# Patient Record
Sex: MALE | Race: BLACK OR AFRICAN AMERICAN | URBAN - METROPOLITAN AREA
[De-identification: names, ages, dates, MRNs, and addresses within clinical notes are randomized per-mention and may not be internally consistent; named-entity substitution may affect disease eponyms.]

---

## 2018-02-07 ENCOUNTER — OFFICE VISIT (OUTPATIENT)
Dept: FAMILY MEDICINE | Facility: CLINIC | Age: 43
End: 2018-02-07
Payer: COMMERCIAL

## 2018-02-07 VITALS
DIASTOLIC BLOOD PRESSURE: 73 MMHG | BODY MASS INDEX: 23.57 KG/M2 | HEART RATE: 90 BPM | WEIGHT: 150.2 LBS | SYSTOLIC BLOOD PRESSURE: 122 MMHG | HEIGHT: 67 IN | TEMPERATURE: 98.4 F | OXYGEN SATURATION: 97 %

## 2018-02-07 DIAGNOSIS — G44.029 CHRONIC CLUSTER HEADACHE, NOT INTRACTABLE: Primary | ICD-10-CM

## 2018-02-07 DIAGNOSIS — J32.9 CHRONIC SINUSITIS, UNSPECIFIED LOCATION: ICD-10-CM

## 2018-02-07 RX ORDER — SUMATRIPTAN 25 MG/1
25-50 TABLET, FILM COATED ORAL
Qty: 18 TABLET | Refills: 1 | Status: SHIPPED | OUTPATIENT
Start: 2018-02-07

## 2018-02-07 RX ORDER — FLUTICASONE PROPIONATE 50 MCG
1-2 SPRAY, SUSPENSION (ML) NASAL DAILY
Qty: 3 BOTTLE | Refills: 1 | Status: SHIPPED | OUTPATIENT
Start: 2018-02-07

## 2018-02-07 RX ORDER — PREDNISONE 20 MG/1
40 TABLET ORAL DAILY
COMMUNITY

## 2018-02-07 NOTE — PROGRESS NOTES
"  Family History   Problem Relation Age of Onset     Other Cancer Other      DIABETES No family hx of      Coronary Artery Disease No family hx of      Hypertension No family hx of      Social History     Social History     Marital status: Single     Spouse name: N/A     Number of children: N/A     Years of education: N/A     Social History Main Topics     Smoking status: Current Every Day Smoker     Packs/day: 0.50     Types: Cigarettes     Smokeless tobacco: Never Used      Comment: has cut down and slowly quitting     Alcohol use Yes      Comment: rarely     Drug use: Yes     Special: Marijuana     Sexual activity: Not Asked     Other Topics Concern     None     Social History Narrative     None       There are no exam notes on file for this visit.  Chief Complaint   Patient presents with     Headache     per pt states that he has had these headaches for the past 20 years and was told it was his sinus. Was seen at Urgent care and given prednisone to take, state that it only lessen how often his headaches would occur.      Blood pressure 122/73, pulse 90, temperature 98.4  F (36.9  C), temperature source Oral, height 5' 6.5\" (168.9 cm), weight 150 lb 3.2 oz (68.1 kg), SpO2 97 %.    S:  Has had headache since 21. Occurs eery April and October every year \"like clock-work\". Patient reports that this year it is early. Now has runny eyes, headache. Patient reports that the pain is around left eye. Pain is throbbing, pulsing. Patient was seen at urgency rooms and diagnosed as episodic cluster headache. No radiation to back of head. Admits to tearing up, runny nose. No numbness, tingling. Admits to having palpation or racing heart with headache. Headaches last for days.. Has tried excedrin with minimal relief    Patient admits to having chronic congestion. No changes in congestion during specific season    Patient has used flonase in the past and it has helped. Has stopped due to concern of possible sinus infection    No " "medicial condition  Patient has history of reconstructive surgery of Left face due to fracture nose    O:  /73  Pulse 90  Temp 98.4  F (36.9  C) (Oral)  Ht 5' 6.5\" (168.9 cm)  Wt 150 lb 3.2 oz (68.1 kg)  SpO2 97%  BMI 23.88 kg/m2  General: On Chair, occasional cough, NAD,   HEENT: No conjunctivitis, no scleral injections, EOM intact.  TM is non-bulging, no erythema, no fluid behind ear.   Patient has noneerythematous nasal tubunates, has clear rhinorhea. patent nares with no deviation of septum  Throat is non erythmatous with no postnasal drip noted with none swollen tonsils  Neck has no adenopathy  Heart: RRR, no murmurs, rubs clicks  Respiratory: No respiratory distress, no accessory muscle use, no cough. clear  breath sounds throughout  Skin: No lesions noted    A/P:  1. Chronic cluster headache, not intractable  From history and physical, likely Cluster headache. Will Rx abortive therapy. Discussed taking it at the beginning and risks and benefits. Ddx include sinus infection, migraine although unlikely. Will need to f/u in 1 month to ensure working and to do well exam  - SUMAtriptan (IMITREX) 25 MG tablet; Take 1-2 tablets (25-50 mg) by mouth at onset of headache for migraine May repeat in 2 hours. Max 8 tablets/24 hours.  Dispense: 18 tablet; Refill: 1    2. Chronic sinusitis, unspecified location  Patient has runny nose most predominant in April and October. Patient may have allergic component to HA. Will Rx flonase. Pt has taken in past but d/c due to concern for infection. Unlikely to be chronic infection (no fever, no pain). Will restart at this time  - fluticasone (FLONASE) 50 MCG/ACT spray; Spray 1-2 sprays into both nostrils daily  Dispense: 3 Bottle; Refill: 1    Gopal Green  Family Medicine Resident PGY3  Patient Instructions   - Try the imitrex as soon as you get  Headache  - If fevers, chills, numbness tingling, bad vision changes, or pain not helped at all, call us or go to ED  - If " any swelling, or feel unwell, stop the imitrix and call us

## 2018-02-07 NOTE — PATIENT INSTRUCTIONS
- Try the imitrex as soon as you get  Headache  - If fevers, chills, numbness tingling, bad vision changes, or pain not helped at all, call us or go to ED  - If any swelling, or feel unwell, stop the imitrix and call us

## 2018-02-07 NOTE — MR AVS SNAPSHOT
After Visit Summary   2018    Joseph Pat    MRN: [id number]           Patient Information     Date Of Birth          1975        Visit Information        Provider Department      2018 1:30 PM Gopal Green DO [facility]        Today's Diagnoses     Chronic cluster headache, not intractable    -  1      Care Instructions    - Try the imitrex as soon as you get  Headache  - If fevers, chills, numbness tingling, bad vision changes, or pain not helped at all, call us or go to ED  - If any swelling, or feel unwell, stop the imitrix and call us          Follow-ups after your visit        Who to contact     Please call your clinic at [phone] to:    Ask questions about your health    Make or cancel appointments    Discuss your medicines    Learn about your test results    Speak to your doctor   If you have compliments or concerns about an experience at your clinic, or if you wish to file a complaint, please contact [facility] Patient Relations at [phone] or email us at [email or web address]         Additional Information About Your Visit        MyChart Information     Voxify is an electronic gateway that provides easy, online access to your medical records. With Voxify, you can request a clinic appointment, read your test results, renew a prescription or communicate with your care team.     To sign up for Voxify visit the website at [email or web address]   You will be asked to enter the access code listed below, as well as some personal information. Please follow the directions to create your username and password.     Your access code is: [email or web address]  Expires: 2018  1:51 PM     Your access code will  in 90 days. If you need help or a new code, please contact your [facility] or call [phone] for assistance.        Care EveryWhere ID     This is your Care EveryWhere ID. This could be  "used by other organizations to access your [place] medical records  [id number]        Your Vitals Were     Pulse Temperature Height Pulse Oximetry BMI (Body Mass Index)       90 98.4  F (36.9  C) (Oral) 5' 6.5\" (168.9 cm) 97% 23.88 kg/m2        Blood Pressure from Last 3 Encounters:   02/07/18 122/73    Weight from Last 3 Encounters:   02/07/18 150 lb 3.2 oz (68.1 kg)              Today, you had the following     No orders found for display         Today's Medication Changes          These changes are accurate as of 2/7/18  1:51 PM.  If you have any questions, ask your nurse or doctor.               Start taking these medicines.        Dose/Directions    SUMAtriptan 25 MG tablet   Commonly known as:  IMITREX   Used for:  Chronic cluster headache, not intractable   Started by:  Gopal Green DO        Dose:  25-50 mg   Take 1-2 tablets (25-50 mg) by mouth at onset of headache for migraine May repeat in 2 hours. Max 8 tablets/24 hours.   Quantity:  18 tablet   Refills:  1            Where to get your medicines      These medications were sent to [facility] [address] [place] [address] [address] AT [facility] [address]  [address], [facility] [address]     Phone:  [phone]     SUMAtriptan 25 MG tablet                Primary Care Provider Office Phone # Fax #    Gopal Green [phone]       [address] [address]        Equal Access to Services     ANTHONY ANDRADE AH: Hadii aad ku hadasho Soomaali, waaxda luqadaha, qaybta kaalmada adeegyada, waxaly newman. So [facility] [phone].    ATENCIÓN: Si habla español, tiene a berumen disposición servicios gratuitos de asistencia lingüística. Llame al [phone].    We comply with applicable federal civil rights laws and [place] laws. We do not discriminate on the basis of race, color, national origin, age, disability, sex, sexual orientation, or " gender identity.            Thank you!     Thank you for choosing [facility]  for your care. Our goal is always to provide you with excellent care. Hearing back from our patients is one way we can continue to improve our services. Please take a few minutes to complete the written survey that you may receive in the mail after your visit with us. Thank you!             Your Updated Medication List - Protect others around you: Learn how to safely use, store and throw away your medicines at www.disposemymeds.org.          This list is accurate as of 2/7/18  1:51 PM.  Always use your most recent med list.                   Brand Name Dispense Instructions for use Diagnosis    EXCEDRIN PO           predniSONE 20 MG tablet    DELTASONE     Take 40 mg by mouth daily        SUMAtriptan 25 MG tablet    IMITREX    18 tablet    Take 1-2 tablets (25-50 mg) by mouth at onset of headache for migraine May repeat in 2 hours. Max 8 tablets/24 hours.    Chronic cluster headache, not intractable

## 2018-02-07 NOTE — PROGRESS NOTES
"Preceptor attestation:  Vital signs reviewed: /73  Pulse 90  Temp 98.4  F (36.9  C) (Oral)  Ht 5' 6.5\" (168.9 cm)  Wt 150 lb 3.2 oz (68.1 kg)  SpO2 97%  BMI 23.88 kg/m2    Patient seen and discussed with the resident. Assessment and plan reviewed with resident and agreed upon.    Supervising physician: Grazyna Martinez MD  [facility]  "

## 2018-03-13 DIAGNOSIS — Z11.3 SCREENING EXAMINATION FOR VENEREAL DISEASE: Primary | ICD-10-CM

## 2018-03-14 ENCOUNTER — OFFICE VISIT (OUTPATIENT)
Dept: FAMILY MEDICINE | Facility: CLINIC | Age: 43
End: 2018-03-14
Payer: COMMERCIAL

## 2018-03-14 VITALS
BODY MASS INDEX: 23.75 KG/M2 | DIASTOLIC BLOOD PRESSURE: 76 MMHG | HEART RATE: 87 BPM | SYSTOLIC BLOOD PRESSURE: 125 MMHG | WEIGHT: 149.4 LBS | OXYGEN SATURATION: 100 % | TEMPERATURE: 98.1 F

## 2018-03-14 DIAGNOSIS — Z11.3 SCREENING EXAMINATION FOR VENEREAL DISEASE: ICD-10-CM

## 2018-03-14 DIAGNOSIS — L85.3 DRY SKIN: Primary | ICD-10-CM

## 2018-03-14 LAB
BILIRUBIN UR: NEGATIVE
BLOOD UR: ABNORMAL
GLUCOSE URINE: NEGATIVE
HIV 1+2 AB+HIV1 P24 AG SERPL QL IA: NEGATIVE
KETONES UR QL: ABNORMAL
LEUKOCYTE ESTERASE UR: NEGATIVE
NITRITE UR QL STRIP: NEGATIVE
PH UR STRIP: 6 [PH] (ref 5–7)
PROTEIN UR: NEGATIVE
SP GR UR STRIP: 1.02
UROBILINOGEN UR STRIP-ACNC: ABNORMAL

## 2018-03-14 RX ORDER — BENZOCAINE/MENTHOL 6 MG-10 MG
LOZENGE MUCOUS MEMBRANE
Qty: 30 G | Refills: 0 | Status: SHIPPED | OUTPATIENT
Start: 2018-03-14

## 2018-03-14 NOTE — MR AVS SNAPSHOT
After Visit Summary   3/14/2018    Joseph Pat    MRN: [id number]           Patient Information     Date Of Birth          1975        Visit Information        Provider Department      3/14/2018 8:20 AM Gopal Green DO [facility]        Today's Diagnoses     Dry skin    -  1    Screening examination for venereal disease          Care Instructions    - Use the lotion daily  - Use the hydrocortisone 1-3 x a day if very itchy. It has a very small chance of bleaching skin  - For the rash, it it becomes bigger than 1 cm, It becomes painful, discharge, hot or bleeding, or just feel unwell, please see us          Follow-ups after your visit        Future tests that were ordered for you today     Open Future Orders        Priority Expected Expires Ordered    Wet Prep ([facility]) Routine  2018 3/13/2018            Who to contact     Please call your clinic at [phone] to:    Ask questions about your health    Make or cancel appointments    Discuss your medicines    Learn about your test results    Speak to your doctor            Additional Information About Your Visit        MyChart Information     AppHero is an electronic gateway that provides easy, online access to your medical records. With AppHero, you can request a clinic appointment, read your test results, renew a prescription or communicate with your care team.     To sign up for AppHero visit the website at [email or web address]   You will be asked to enter the access code listed below, as well as some personal information. Please follow the directions to create your username and password.     Your access code is: [email or web address]  Expires: 2018  2:51 PM     Your access code will  in 90 days. If you need help or a new code, please contact your [facility] or call [phone] for assistance.        Care EveryWhere ID     This is your Care EveryWhere ID. This could be used by other  organizations to access your [place] medical records  [id number]        Your Vitals Were     Pulse Temperature Pulse Oximetry BMI (Body Mass Index)          87 98.1  F (36.7  C) (Oral) 100% 23.75 kg/m2         Blood Pressure from Last 3 Encounters:   03/14/18 125/76   02/07/18 122/73    Weight from Last 3 Encounters:   03/14/18 149 lb 6.4 oz (67.8 kg)   02/07/18 150 lb 3.2 oz (68.1 kg)              We Performed the Following     Chlamydia/Gono Amplified ([facility])     HIV Ag/Ab Screen [place] ([facility])     Syphilis Screen [place] (RPR/VDRL) ([facility])     Urinalysis([facility])          Today's Medication Changes          These changes are accurate as of 3/14/18  9:01 AM.  If you have any questions, ask your nurse or doctor.               Start taking these medicines.        Dose/Directions    hydrocortisone 1 % cream   Commonly known as:  CORTAID   Used for:  Dry skin   Started by:  Gopal Green DO        Apply sparingly to affected area three times daily for 14 days.   Quantity:  30 g   Refills:  0            Where to get your medicines      These medications were sent to [facility] [address] [place] [address] [address] AT [address]  [address], [facility] [address]     Phone:  [phone]     hydrocortisone 1 % cream                Primary Care Provider Office Phone # Fax #    Gopal Green [phone]       [address] [address]        Equal Access to Services     ANTHONY ANDRADE AH: Hadii karsten ku hadasho Soomaali, waaxda luqadaha, qaybta kaalmada adeegyada, jose alejandro newman. So [facility] [phone].    ATENCIÓN: Si habla español, tiene a berumen disposición servicios gratuitos de asistencia lingüística. Llame al [phone].    We comply with applicable federal civil rights laws and [place] laws. We do not discriminate on the basis of race, color, national origin, age,  disability, sex, sexual orientation, or gender identity.            Thank you!     Thank you for choosing [facility]  for your care. Our goal is always to provide you with excellent care. Hearing back from our patients is one way we can continue to improve our services. Please take a few minutes to complete the written survey that you may receive in the mail after your visit with us. Thank you!             Your Updated Medication List - Protect others around you: Learn how to safely use, store and throw away your medicines at www.disposemymeds.org.          This list is accurate as of 3/14/18  9:01 AM.  Always use your most recent med list.                   Brand Name Dispense Instructions for use Diagnosis    EXCEDRIN PO           fluticasone 50 MCG/ACT spray    FLONASE    3 Bottle    Spray 1-2 sprays into both nostrils daily    Chronic sinusitis, unspecified location       hydrocortisone 1 % cream    CORTAID    30 g    Apply sparingly to affected area three times daily for 14 days.    Dry skin       predniSONE 20 MG tablet    DELTASONE     Take 40 mg by mouth daily        SUMAtriptan 25 MG tablet    IMITREX    18 tablet    Take 1-2 tablets (25-50 mg) by mouth at onset of headache for migraine May repeat in 2 hours. Max 8 tablets/24 hours.    Chronic cluster headache, not intractable

## 2018-03-14 NOTE — PROGRESS NOTES
Family History   Problem Relation Age of Onset     Other Cancer Other      DIABETES Sister      Coronary Artery Disease No family hx of      Hypertension No family hx of      CANCER No family hx of      HEART DISEASE No family hx of      Social History     Social History     Marital status: Single     Spouse name: N/A     Number of children: N/A     Years of education: N/A     Social History Main Topics     Smoking status: Current Every Day Smoker     Packs/day: 0.50     Types: Cigarettes     Smokeless tobacco: Never Used      Comment: has cut down and slowly quitting     Alcohol use Yes      Comment: rarely     Drug use: Yes     Special: Marijuana     Sexual activity: Not Asked     Other Topics Concern     None     Social History Narrative       There are no exam notes on file for this visit.  Chief Complaint   Patient presents with     STD     come here today for STD screening, have no other concern per patient.      Blood pressure 125/76, pulse 87, temperature 98.1  F (36.7  C), temperature source Oral, weight 149 lb 6.4 oz (67.8 kg), SpO2 100 %.      S:  Patient is here for follow up. Cluster headache is well control with imatrex. Stops immediately after taking pills. Patient's allergies is also resolved after season changed. Had to stop using flonase due to scab that formed in nostril. Not interested in other medication at this time as symptoms well controlled .     Patient is here to discuss of STD. Reports that has had papule on penis since he was a teenager. Started to get raised about about 8 months ago. It is starting to increase in size again this [place]. Girlfriend of 4 years negative Pap and HPV.Pt admits to  History of chlamydia in 2000. Girlfriend denies any STD. Concerning pt as lesion looks like HPV or wart from google search. Other rash on back and chest started this week. No fevers, chills, no discharge or pain with urination. No pain during sex. Denies blood in urin or stool    O:  /76   Pulse 87  Temp 98.1  F (36.7  C) (Oral)  Wt 149 lb 6.4 oz (67.8 kg)  SpO2 100%  BMI 23.75 kg/m2   General: On chair, no acute distress  HEENT: EOM grossly intact, no cunjunctivities  Heart: RRR no murmurs, rubs clicks  Lungs: CTA throughout  : paitent has circumsices penis without discharge. 2 testicls, non tender to palpation. No hernia palapated in inguinal canal. Penis has no discharge. There are small papules (<1mm) throughout penis and on base of glans. They are not tender, not open, not weaping. They are not hot or puritic.   Skin: Lesion on penis as above. There is a smal ovale shape lesion with thick raised scale on pt R upper abd. There are red excoration o patients upper chest about 1cm x 0.5cm long, Not open, not weeping    A/P;  1. Screening examination for venereal disease  From examination. Appears to be normal papuleas or milia on penis. The are evenly distributed and not red, hot, tender, or with discharge. Discussed extensively warning symptoms. Will also obtain STD checks. UA negative  - Chlamydia/Gono Amplified ([facility])  - Syphilis Screen [place] (RPR/VDRL) ([facility])  - Urinalysis([facility])  - HIV Ag/Ab Screen [place] ([facility])    2. Dry skin  Pt has lesion on abd and chest that appears to be dry skin. DDx include pityriasis rosia as oval shaped but no other symptoms at this time and will just need symptomatic care. Also concern for possible systemic effect of secondary syphilis and awaiting result of screen (as above). Will rx hydrocortisone and discussed with patient to wash hand throughly prior to touching other people and to use hydrocortison sparingly as may bleach skin. Pt. Is aware of SE  - hydrocortisone (CORTAID) 1 % cream; Apply sparingly to affected area three times daily for 14 days.  Dispense: 30 g; Refill: 0    Gopal Green  Family Medicine Resident PGY3  Patient Instructions   - Use the lotion daily  - Use the hydrocortisone 1-3 x a day if very itchy. It has a very  small chance of bleaching skin  - For the rash, it it becomes bigger than 1 cm, It becomes painful, discharge, hot or bleeding, or just feel unwell, please see us

## 2018-03-14 NOTE — PATIENT INSTRUCTIONS
- Use the lotion daily  - Use the hydrocortisone 1-3 x a day if very itchy. It has a very small chance of bleaching skin  - For the rash, it it becomes bigger than 1 cm, It becomes painful, discharge, hot or bleeding, or just feel unwell, please see us

## 2018-03-14 NOTE — PROGRESS NOTES
Preceptor attestation:  Patient seen and discussed with the resident. Assessment and plan reviewed with resident and agreed upon.  Supervising physician: Sky Mata MD  [facility]

## 2018-03-15 LAB
C TRACH RRNA SPEC QL NAA+PROBE: NEGATIVE
N GONORRHOEA RRNA SPEC QL NAA+PROBE: NEGATIVE
TREPONEMA ANTIBODY (SYPHILIS): NEGATIVE

## 2018-03-16 NOTE — PROGRESS NOTES
Pt. Was called and results were discussed with him.  All the results of the STD is negative. You have no STDs. Please call us with any questions.    Gopal Green  Family Medicine Resident PGY3

## 2020-03-11 ENCOUNTER — HEALTH MAINTENANCE LETTER (OUTPATIENT)
Age: 45
End: 2020-03-11

## 2021-01-03 ENCOUNTER — HEALTH MAINTENANCE LETTER (OUTPATIENT)
Age: 46
End: 2021-01-03

## 2021-04-25 ENCOUNTER — HEALTH MAINTENANCE LETTER (OUTPATIENT)
Age: 46
End: 2021-04-25

## 2021-10-10 ENCOUNTER — HEALTH MAINTENANCE LETTER (OUTPATIENT)
Age: 46
End: 2021-10-10

## 2022-05-21 ENCOUNTER — HEALTH MAINTENANCE LETTER (OUTPATIENT)
Age: 47
End: 2022-05-21

## 2022-09-18 ENCOUNTER — HEALTH MAINTENANCE LETTER (OUTPATIENT)
Age: 47
End: 2022-09-18

## 2023-06-04 ENCOUNTER — HEALTH MAINTENANCE LETTER (OUTPATIENT)
Age: 48
End: 2023-06-04